# Patient Record
Sex: FEMALE | Race: WHITE | NOT HISPANIC OR LATINO | Employment: PART TIME | URBAN - METROPOLITAN AREA
[De-identification: names, ages, dates, MRNs, and addresses within clinical notes are randomized per-mention and may not be internally consistent; named-entity substitution may affect disease eponyms.]

---

## 2017-07-05 ENCOUNTER — ALLSCRIPTS OFFICE VISIT (OUTPATIENT)
Dept: OTHER | Facility: OTHER | Age: 22
End: 2017-07-05

## 2017-07-05 DIAGNOSIS — F41.9 ANXIETY DISORDER: ICD-10-CM

## 2017-07-14 ENCOUNTER — APPOINTMENT (OUTPATIENT)
Dept: LAB | Age: 22
End: 2017-07-14
Payer: COMMERCIAL

## 2017-07-14 ENCOUNTER — TRANSCRIBE ORDERS (OUTPATIENT)
Dept: ADMINISTRATIVE | Age: 22
End: 2017-07-14

## 2017-07-14 DIAGNOSIS — F41.9 ANXIETY DISORDER: ICD-10-CM

## 2017-07-14 LAB
T4 FREE SERPL-MCNC: 2.06 NG/DL (ref 0.76–1.46)
TSH SERPL DL<=0.05 MIU/L-ACNC: 1.01 UIU/ML (ref 0.36–3.74)

## 2017-07-14 PROCEDURE — 84439 ASSAY OF FREE THYROXINE: CPT

## 2017-07-14 PROCEDURE — 36415 COLL VENOUS BLD VENIPUNCTURE: CPT

## 2017-07-14 PROCEDURE — 84443 ASSAY THYROID STIM HORMONE: CPT

## 2017-11-26 ENCOUNTER — TRANSCRIBE ORDERS (OUTPATIENT)
Dept: ADMINISTRATIVE | Age: 22
End: 2017-11-26

## 2017-11-26 ENCOUNTER — OFFICE VISIT (OUTPATIENT)
Dept: URGENT CARE | Age: 22
End: 2017-11-26
Payer: COMMERCIAL

## 2017-11-26 ENCOUNTER — APPOINTMENT (OUTPATIENT)
Dept: LAB | Age: 22
End: 2017-11-26
Attending: PHYSICIAN ASSISTANT
Payer: COMMERCIAL

## 2017-11-26 DIAGNOSIS — R30.0 DYSURIA: ICD-10-CM

## 2017-11-26 PROCEDURE — 87186 SC STD MICRODIL/AGAR DIL: CPT

## 2017-11-26 PROCEDURE — 87077 CULTURE AEROBIC IDENTIFY: CPT

## 2017-11-26 PROCEDURE — 81002 URINALYSIS NONAUTO W/O SCOPE: CPT | Performed by: FAMILY MEDICINE

## 2017-11-26 PROCEDURE — 99213 OFFICE O/P EST LOW 20 MIN: CPT | Performed by: FAMILY MEDICINE

## 2017-11-26 PROCEDURE — 87086 URINE CULTURE/COLONY COUNT: CPT

## 2017-11-28 NOTE — PROGRESS NOTES
Assessment    1  Urinary tract infection (599 0) (N39 0)    Plan  Dysuria    · (1) URINE CULTURE; Source:Urine, Clean Catch; Status:Active - Retrospective ByProtocol Authorization; Requested for:[id number];    · Urine Dip Non-Automated- POC; Status:Complete;   Done: [id number] 02:51PM  Urinary tract infection    · Sulfamethoxazole-Trimethoprim 800-160 MG Oral Tablet (Bactrim DS); TAKE 1TABLET TWICE DAILY UNTIL FINISHED    Discussion/Summary  Discussion Summary:   Take Bactrim twice daily for 5 days  us in 2-3 days for urine culture results  fluidsdaily  Medication Side Effects Reviewed: Possible side effects of new medications were reviewed with the patient/guardian today  Understands and agrees with treatment plan: The treatment plan was reviewed with the patient/guardian  The patient/guardian understands and agrees with the treatment plan   Follow Up Instructions: Follow Up with your Primary Care Provider in 5 days  If your symptoms worsen, go to the nearest [place] [address] Emergency Department  Chief Complaint    1  Urinary Frequency  Chief Complaint Free Text Note Form: C/O urinary frequency, burning and slight pressure for 3 days ago  Then stopped and restarted last night  History of Present Illness  HPI: 79-year-old female presents with suprapubic pressure, urinary burning, and urinary frequency x2 days  She denies any new sexual partners  She has no vaginal discharge  Denies fever, chills, flank pain, nausea, vomiting  Hospital Based Practices Required Assessment:  Pain Assessment  the patient states they have pain  The pain is located in the urinary  The patient describes the pain as burning  (on a scale of 0 to 10, the patient rates the pain at 8 )  Abuse And Domestic Violence Screen   Yes, the patient is safe at home  -- The patient states no one is hurting them  Depression And Suicide Screen  No, the patient has not had thoughts of hurting themself    No, the patient has not felt depressed in the past 7 days  Review of Systems  Focused-Female:  Constitutional: no fever-- and-- no chills  Cardiovascular: no chest pain  Respiratory: no shortness of breath  Gastrointestinal: no nausea-- and-- no vomiting  Genitourinary: as noted in HPI,-- no vaginal discharge-- and-- no unexplained vaginal bleeding  Neurological: no headache  ROS Reviewed:   ROS reviewed  Active Problems  1  Anxiety (300 00) (F41 9)   2  Need for 23-polyvalent pneumococcal polysaccharide vaccine (V03 82) (Z23)   3  Need for influenza vaccination (V04 81) (Z23)   4  Need for Menactra vaccination (V03 89) (Z23)   5  Need for Tdap vaccination (V06 1) (Z23)   6  Need for tuberculosis vaccination (V03 2) (Z23)   7  Skin lesion (709 9) (L98 9)    Past Medical History  Active Problems And Past Medical History Reviewed: The active problems and past medical history were reviewed and updated today  Family History  Father    1  No pertinent family history  Family History Reviewed: The family history was reviewed and updated today  Social History     · Denied: History of Alcohol   · Denied: History of Drug Use   · Never A Smoker  Social History Reviewed: The social history was reviewed and updated today  The social history was reviewed and is unchanged  Surgical History  Surgical History Reviewed: The surgical history was reviewed and updated today  Current Meds   1  Multivitamins TABS; Therapy: (Recorded:26Nov2017) to Recorded  Medication List Reviewed: The medication list was reviewed and updated today  Allergies    1   Doxycycline Hyclate TABS    Vitals  Signs   Recorded: [id number] 02:33PM   Temperature: 98 4 F, Temporal  Heart Rate: 62  Respiration: 16  Systolic: 191, LUE, Sitting  Diastolic: 68, LUE, Sitting  Height: 5 ft 2 in  Weight: 104 lb   BMI Calculated: 19 02  BSA Calculated: 1 45  O2 Saturation: 98    Physical Exam   Constitutional  General appearance: No acute distress, well appearing and well nourished  Pulmonary  Respiratory effort: No increased work of breathing or signs of respiratory distress  Auscultation of lungs: Clear to auscultation  Cardiovascular  Auscultation of heart: Normal rate and rhythm, normal S1 and S2, without murmurs  Abdomen  Abdomen: Non-tender, no masses  The abdomen was flat  Bowel sounds were normal  The abdomen was soft and nontender  no CVA tenderness      Results/Data  Urine Dip Non-Automated- POC [id number] 02:51PM Morgan Mayesdavid     Test Name Result Flag Reference   Color Yellow     Clarity Hazy     Leukocytes small     Nitrite neg     Blood neg     Bilirubin neg     Urobilinogen 0 2     Protein neg     Ph 6 5     Specific Gravity 1 005     Ketone neg     Glucose neg         Lab Studies Reviewed: Urine dip results reviewed w patient- mild abnormalities- could indicate UTI        Signatures   Electronically signed by : Tena Lee [facility]; Nov 26 2017  3:04PM EST                       (Author)    Electronically signed by : Domingo Womack DO; Nov 27 2017  7:12AM EST                       (Co-author)

## 2017-11-29 LAB — BACTERIA UR CULT: ABNORMAL

## 2018-01-15 VITALS
BODY MASS INDEX: 18.95 KG/M2 | SYSTOLIC BLOOD PRESSURE: 90 MMHG | WEIGHT: 103 LBS | DIASTOLIC BLOOD PRESSURE: 60 MMHG | HEART RATE: 68 BPM | RESPIRATION RATE: 14 BRPM | HEIGHT: 62 IN

## 2018-05-04 ENCOUNTER — TELEPHONE (OUTPATIENT)
Dept: INTERNAL MEDICINE CLINIC | Facility: CLINIC | Age: 23
End: 2018-05-04

## 2018-05-04 NOTE — TELEPHONE ENCOUNTER
PT STATED THAT SHE HAS A FORM THAT NEEDS TO BE FILLED OUT FOR SCHOOL  SHE STATED THAT SHE NEEDS IT BY 7/15  PLEASE ADVISE AS TO WHERE I CAN PUT HER ON THE SCHEDULE       PT'S CB# [phone]

## 2018-06-06 ENCOUNTER — OFFICE VISIT (OUTPATIENT)
Dept: INTERNAL MEDICINE CLINIC | Facility: CLINIC | Age: 23
End: 2018-06-06
Payer: COMMERCIAL

## 2018-06-06 VITALS
RESPIRATION RATE: 14 BRPM | HEIGHT: 62 IN | WEIGHT: 107.8 LBS | SYSTOLIC BLOOD PRESSURE: 100 MMHG | BODY MASS INDEX: 19.84 KG/M2 | DIASTOLIC BLOOD PRESSURE: 70 MMHG | HEART RATE: 68 BPM

## 2018-06-06 DIAGNOSIS — F41.9 ANXIETY: Primary | Chronic | ICD-10-CM

## 2018-06-06 PROCEDURE — 3008F BODY MASS INDEX DOCD: CPT | Performed by: INTERNAL MEDICINE

## 2018-06-06 PROCEDURE — 99213 OFFICE O/P EST LOW 20 MIN: CPT | Performed by: INTERNAL MEDICINE

## 2018-06-06 NOTE — PROGRESS NOTES
Assessment/Plan:  1  General care-physical completed-ppd not done as this was done a year ago  2  Anxiety-worsened over the past 2 years  Has some secondary depression  Was seeing a counselor Michael intermittently  Family history anxiety depression  She requested a p r n  med today was given Klonopin 0 5 milligrams 1/2 to 1 p o  b i d  p r n     As noted TSH normal  3  Abnormal thyroid blood work-TSH normal but free T4 elevated-endocrinologist feels this is an abnormal pattern for her family and that TSH is basically normal in no significant issues  4  Previously noted skin lesions-by description may have been staph coccal-rule out MRSA-was treated with doxycycline at another institution and all resolved  5  History of surgery young age with repair with a soft feels fish alert as well as renal surgery  Was felt to have a small syrinx as well as VSD that have not been issues since she was a child  She is released from prior physician the told no follow-up needed    Medical regimen-Klonopin 0 5 milligrams - 1/2 to 1 p o  B i d  P r n  Addendum- patient had PPD that was needed was placed and read in July 2018 with no reaction    Addendum-patient called the office in January 2nd regarding referral for psychologist   She was given the number about some counseling  Was also recommended she come in for follow-up appointment because of her p r n  benzodiazepine use  No problem-specific Assessment & Plan notes found for this encounter  Diagnoses and all orders for this visit:    Anxiety          Subjective:      Patient ID: Cyndi Herrera is a 20 y o  female  Needs a school physical   This was completed  Having some intermittent needs IV issues as before  Previously spoke with the local counselor  She had been seeing her previously  Family history  She had labs done    TSH normal   Free T4 elevated although her mother has same pattern endocrinologist feels this is likely get echo-specifically 5 TSH normal but free T4 elevated  He was even thinking about with the patient's mother ordering free T4 but dialysis  She wants know about p r n  use of a benzodiazepine  We reviewed that in detail  She says she does not have any major depressive symptoms  Not a significant alcohol user  No recreational drug use  The        The following portions of the patient's history were reviewed and updated as appropriate: current medications, past family history, past medical history, past social history, past surgical history and problem list     Review of Systems   Constitutional: Negative  Respiratory: Negative  Cardiovascular: Negative  Gastrointestinal: Negative  Endocrine: Negative  Genitourinary: Negative  Musculoskeletal: Negative  Neurological: Negative  Hematological: Negative  Psychiatric/Behavioral: The patient is nervous/anxious  Objective:      /70   Pulse 68   Resp 14   Ht 5' 2" (1 575 m)   Wt 48 9 kg (107 lb 12 8 oz)   BMI 19 72 kg/m²          Physical Exam   Constitutional: She is oriented to person, place, and time  She appears well-developed and well-nourished  No distress  HENT:   Head: Normocephalic and atraumatic  Right Ear: External ear normal    Left Ear: External ear normal    Nose: Nose normal    Mouth/Throat: Oropharynx is clear and moist  No oropharyngeal exudate  Eyes: Conjunctivae and EOM are normal  Pupils are equal, round, and reactive to light  Right eye exhibits no discharge  Left eye exhibits no discharge  No scleral icterus  Neck: Normal range of motion  Neck supple  No JVD present  No tracheal deviation present  No thyromegaly present  Cardiovascular: Normal rate, regular rhythm, normal heart sounds and intact distal pulses  Exam reveals no gallop and no friction rub  No murmur heard  Pulmonary/Chest: Effort normal and breath sounds normal  No stridor  No respiratory distress  She has no wheezes   She has no rales  She exhibits no tenderness  Abdominal: Soft  Bowel sounds are normal  She exhibits no distension and no mass  There is no tenderness  There is no rebound and no guarding  Musculoskeletal: Normal range of motion  She exhibits no edema or deformity  Lymphadenopathy:     She has no cervical adenopathy  Neurological: She is alert and oriented to person, place, and time  She has normal reflexes  No cranial nerve deficit  She exhibits normal muscle tone  Coordination normal    Skin: Skin is warm and dry  No rash noted  No erythema  Psychiatric: She has a normal mood and affect  Her behavior is normal  Judgment and thought content normal    Vitals reviewed

## 2018-07-19 RX ORDER — SULFAMETHOXAZOLE AND TRIMETHOPRIM 800; 160 MG/1; MG/1
1 TABLET ORAL 2 TIMES DAILY
COMMUNITY
Start: 2017-11-26 | End: 2019-02-07 | Stop reason: ALTCHOICE

## 2018-07-19 RX ORDER — MULTIVITAMIN
TABLET ORAL
COMMUNITY

## 2018-07-24 ENCOUNTER — CLINICAL SUPPORT (OUTPATIENT)
Dept: INTERNAL MEDICINE CLINIC | Facility: CLINIC | Age: 23
End: 2018-07-24
Payer: COMMERCIAL

## 2018-07-24 DIAGNOSIS — Z11.1 PPD SCREENING TEST: Primary | ICD-10-CM

## 2018-07-24 PROCEDURE — 86580 TB INTRADERMAL TEST: CPT

## 2018-07-24 PROCEDURE — 99211 OFF/OP EST MAY X REQ PHY/QHP: CPT

## 2018-07-31 LAB
INDURATION: 0.01 MM
TB SKIN TEST: NEGATIVE

## 2018-11-23 ENCOUNTER — HOSPITAL ENCOUNTER (EMERGENCY)
Facility: HOSPITAL | Age: 23
Discharge: HOME | End: 2018-11-23
Attending: EMERGENCY MEDICINE | Admitting: EMERGENCY MEDICINE
Payer: COMMERCIAL

## 2018-11-23 ENCOUNTER — APPOINTMENT (EMERGENCY)
Dept: RADIOLOGY | Facility: HOSPITAL | Age: 23
End: 2018-11-23
Payer: COMMERCIAL

## 2018-11-23 VITALS
OXYGEN SATURATION: 100 % | HEIGHT: 62 IN | SYSTOLIC BLOOD PRESSURE: 154 MMHG | HEART RATE: 62 BPM | TEMPERATURE: 98.3 F | BODY MASS INDEX: 19.72 KG/M2 | RESPIRATION RATE: 18 BRPM | DIASTOLIC BLOOD PRESSURE: 67 MMHG

## 2018-11-23 DIAGNOSIS — S60.111A SUBUNGUAL HEMATOMA OF RIGHT THUMB, INITIAL ENCOUNTER: Primary | ICD-10-CM

## 2018-11-23 PROCEDURE — 73140 X-RAY EXAM OF FINGER(S): CPT

## 2018-11-23 PROCEDURE — 99283 EMERGENCY DEPT VISIT LOW MDM: CPT

## 2018-11-24 NOTE — ED PROVIDER NOTES
History  Chief Complaint   Patient presents with    Finger Injury     Presents for evaluation of right thumb injury s/p slamming finger in car door x3 days prior  Finger progressively more swollen and nail bed now black  ROM presents  Last Tylenol PTA 1645  This is a 27-year-old female patient who closed her right thumb in a car door approximately 3 days ago and now has throbbing under her nail and a collection of blood consistent with a subungual hematoma  This pain is refractory to Motrin  No numbness or tingling  Nothing really makes the pain better or worse  She will have an x-ray to rule out a fracture and then she will undergo a trephination      Procedure note:  Patient's right thumb had a trephination performed with the he did tip  There is immediate blood release and drainage and the pain went away  There is no anesthesia necessary she tolerated procedure well without complication has good distal sensation range of motion of the thumb  Prior to Admission Medications   Prescriptions Last Dose Informant Patient Reported? Taking? Multiple Vitamin (MULTI-DAY VITAMINS) TABS   Yes No   Sig: Take by mouth   sulfamethoxazole-trimethoprim (BACTRIM DS) 800-160 mg per tablet   Yes No   Sig: Take 1 tablet by mouth 2 (two) times a day      Facility-Administered Medications: None       History reviewed  No pertinent past medical history  History reviewed  No pertinent surgical history  Family History   Problem Relation Age of Onset    No Known Problems Father      I have reviewed and agree with the history as documented  Social History   Substance Use Topics    Smoking status: Never Smoker    Smokeless tobacco: Never Used    Alcohol use No        Review of Systems   All other systems reviewed and are negative  Physical Exam  Physical Exam   Constitutional: She appears well-developed and well-nourished  HENT:   Head: Normocephalic and atraumatic     Right Ear: External ear normal    Left Ear: External ear normal    Nose: Nose normal    Mouth/Throat: Oropharynx is clear and moist    Eyes: Pupils are equal, round, and reactive to light  Conjunctivae are normal    Neck: Normal range of motion  Neck supple  Cardiovascular: Normal rate and regular rhythm  Pulmonary/Chest: Effort normal and breath sounds normal    Abdominal: Soft  Bowel sounds are normal  There is no tenderness  Musculoskeletal: Normal range of motion  Hands:  Neurological: She is alert  Skin: Skin is warm  Psychiatric: She has a normal mood and affect  Her behavior is normal    Nursing note and vitals reviewed  Vital Signs  ED Triage Vitals [11/23/18 1918]   Temperature Pulse Respirations Blood Pressure SpO2   98 3 °F (36 8 °C) 62 18 154/67 100 %      Temp Source Heart Rate Source Patient Position - Orthostatic VS BP Location FiO2 (%)   Oral Monitor Sitting Right arm --      Pain Score       8           Vitals:    11/23/18 1918   BP: 154/67   Pulse: 62   Patient Position - Orthostatic VS: Sitting       Visual Acuity      ED Medications  Medications - No data to display    Diagnostic Studies  Results Reviewed     None                 XR thumb first digit-min 2 views RIGHT   ED Interpretation by Victorino Rubio PA-C (11/23 1940)   NAD                 Procedures  Procedures       Phone Contacts  ED Phone Contact    ED Course                               MDM  CritCare Time    Disposition  Final diagnoses:   Subungual hematoma of right thumb, initial encounter     Time reflects when diagnosis was documented in both MDM as applicable and the Disposition within this note     Time User Action Codes Description Comment    11/23/2018  7:52 PM [place], [address] [facility] Add [S60 111A] Subungual hematoma of right thumb, initial encounter       ED Disposition     ED Disposition Condition Comment    Discharge  Jak Reynoso discharge to home/self care      Condition at discharge: Good        Follow-up Information Follow up With Specialties Details Why Contact Info    Joshua Escobar MD Plastic Surgery, Hand Surgery Schedule an appointment as soon as possible for a visit  [address]  [address]            Patient's Medications   Discharge Prescriptions    No medications on file     No discharge procedures on file      ED Provider  Electronically Signed by           Kimmie Hoffman PA-C  11/23/18 1952

## 2018-11-24 NOTE — DISCHARGE INSTRUCTIONS
Subungual Hematoma   WHAT YOU NEED TO KNOW:   What is a subungual hematoma? A subungual hematoma is a collection of blood under your fingernail or toenail  The most common cause is an injury, such as slamming your nail in a door or dropping an object on your nail  A subungual hematoma can occur if you take blood thinning or cancer treatment medicines  You may also be at risk if you have an autoimmune condition, Kaposi sarcoma, or melanoma  What are the signs and symptoms of a subungual hematoma? · Red or purplish blood collection under the nail    · Throbbing pain and swelling in the affected finger or toe    · Tenderness to the touch  How is a subungual hematoma diagnosed? Your healthcare provider will know you have a subungual hematoma by looking at your nail  The following tests may also be done:  · A dermoscopy is a procedure used to look for damage to your nail through a microscope  · An x-ray  of your hand or foot may be done to check for broken bones  · A biopsy is a procedure to remove all or part of your nail  The nail is sent to a lab for tests  How is a subungual hematoma treated? · Ice and elevate your affected finger or toe  Place ice wrapped in a towel over the painful area for as long as directed  Elevate your hand or foot on pillows above the level of your heart to help decrease swelling and pain  · NSAIDs  help decrease swelling and pain  This medicine is available without a doctor's order  NSAIDs can cause stomach bleeding or kidney problems  If you take blood thinner medicine, always ask your healthcare provider if NSAIDs are safe for you  Always read the medicine label and follow directions  · Trephination is a procedure used to make a small hole or holes in your nail to drain the blood  This will help decrease pain and swelling  · Nail removal may be done if your nail is damaged  Your healthcare provider may also need to repair the tissue under your nail       · A splint may be placed on your finger or toe to prevent movement and protect the area while it heals  How can I help care for my subungual hematoma? · Keep your injured finger or toe dry for as long as directed  · Gently trim your nail if it begins to fall off in pieces  This may decrease your risk for catching the nail on an object or ripping it off  · Wear shoes that are comfortable and fit correctly to prevent more injury to your toe  When should I contact my healthcare provider? · You have increased redness, swelling, or pain  · You notice pus or a bad smell coming from your nail  · You see red streaks on your finger or toe that starts from your nail  · Your nail falls off and there is bleeding  · You have questions or concerns about your condition or care  CARE AGREEMENT:   You have the right to help plan your care  Learn about your health condition and how it may be treated  Discuss treatment options with your caregivers to decide what care you want to receive  You always have the right to refuse treatment  The above information is an  only  It is not intended as medical advice for individual conditions or treatments  Talk to your doctor, nurse or pharmacist before following any medical regimen to see if it is safe and effective for you  © 2017 [facility] Information is for End User's use only and may not be sold, redistributed or otherwise used for commercial purposes  All illustrations and images included in CareNotes® are the copyrighted property of A D A M [employer]  or [place]

## 2018-12-31 ENCOUNTER — TELEPHONE (OUTPATIENT)
Dept: INTERNAL MEDICINE CLINIC | Facility: CLINIC | Age: 23
End: 2018-12-31

## 2019-01-02 NOTE — TELEPHONE ENCOUNTER
For psychologist I recommend [facility] counseling-please give her the number-explained to her they will take her history and place her with appropriate counselor    Also she should have a follow-up appointment through this office regarding her medication use-schedule her over the next month with Dr Harris-let her know the office situation and that he will be her new primary care physician

## 2019-01-07 ENCOUNTER — OFFICE VISIT (OUTPATIENT)
Dept: INTERNAL MEDICINE CLINIC | Facility: CLINIC | Age: 24
End: 2019-01-07
Payer: COMMERCIAL

## 2019-01-07 VITALS
OXYGEN SATURATION: 98 % | HEART RATE: 58 BPM | HEIGHT: 62 IN | BODY MASS INDEX: 19.14 KG/M2 | DIASTOLIC BLOOD PRESSURE: 60 MMHG | WEIGHT: 104 LBS | SYSTOLIC BLOOD PRESSURE: 100 MMHG

## 2019-01-07 DIAGNOSIS — Z13.220 SCREENING FOR HYPERLIPIDEMIA: ICD-10-CM

## 2019-01-07 DIAGNOSIS — E55.9 VITAMIN D DEFICIENCY: ICD-10-CM

## 2019-01-07 DIAGNOSIS — Z00.00 ENCOUNTER FOR ANNUAL PHYSICAL EXAMINATION EXCLUDING GYNECOLOGICAL EXAMINATION IN A PATIENT OLDER THAN 17 YEARS: ICD-10-CM

## 2019-01-07 DIAGNOSIS — Z13.29 SCREENING FOR THYROID DISORDER: ICD-10-CM

## 2019-01-07 DIAGNOSIS — L03.011 PARONYCHIA OF RIGHT THUMB: ICD-10-CM

## 2019-01-07 DIAGNOSIS — F41.9 ANXIETY: ICD-10-CM

## 2019-01-07 DIAGNOSIS — Z13.1 SCREENING FOR DIABETES MELLITUS: Primary | ICD-10-CM

## 2019-01-07 PROCEDURE — 99395 PREV VISIT EST AGE 18-39: CPT | Performed by: INTERNAL MEDICINE

## 2019-01-07 NOTE — PROGRESS NOTES
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 [employer]   -states that she met her boyfriend's family over the holidays resulting in significant stress  -states that she is currently taking Klonopin 0 5 mg, half dose to full dose b i d  P r n  As needed  -states that she is worried about her confidence issues and will be seeing [facility] tomorrow for evaluation    #Abnormal thyroid  -reports history of in her mother with hypothyroidism with thyroid removed and on synthroid  -TSH normal with elevated T4  -recheck TSH today    #Family History of CAD  -positive in paternal grandfather with MI, CABG, pacer, heart transplant  Father has HTN  -will check HLD    #Family History of DM  -positive in grandmother  -will check a1c today    #Health Maintenance  -routine labs today and return to care as needed  -flu vaccine 2018 and up to date  -HPV vaccine script given to patient  -referral to gyn for pap and pelvic    Patient completed routine labs revealing A1c 5 4, vitamin-D 21 7, TSH 1 085  Awaiting lipid and CMP panel  Message was left on patient's voicemail to start 2000 units vitamin-D daily  Addendum 9/6/19 patient encountered a needle stick at work and had hepatitis B titers drawn which showed surface Ab less than 3 1  Patient will need revaccination with HBV vaccine  Also refilled her clonazepam     No problem-specific Assessment & Plan notes found for this encounter  Diagnoses and all orders for this visit:    Screening for diabetes mellitus  -     CBC and differential; Future  -     Vitamin D 25 hydroxy; Future  -     Lipid Panel with Direct LDL reflex; Future  -     Hemoglobin A1C; Future  -     TSH, 3rd generation with Free T4 reflex; Future  -     Comprehensive metabolic panel;  Future    Screening for hyperlipidemia  - CBC and differential; Future  -     Vitamin D 25 hydroxy; Future  -     Lipid Panel with Direct LDL reflex; Future  -     Hemoglobin A1C; Future  -     TSH, 3rd generation with Free T4 reflex; Future  -     Comprehensive metabolic panel; Future    Screening for thyroid disorder  -     CBC and differential; Future  -     Vitamin D 25 hydroxy; Future  -     Lipid Panel with Direct LDL reflex; Future  -     Hemoglobin A1C; Future  -     TSH, 3rd generation with Free T4 reflex; Future  -     Comprehensive metabolic panel; Future    Vitamin D deficiency  -     CBC and differential; Future  -     Vitamin D 25 hydroxy; Future  -     Lipid Panel with Direct LDL reflex; Future  -     Hemoglobin A1C; Future  -     TSH, 3rd generation with Free T4 reflex; Future  -     Comprehensive metabolic panel; Future    Paronychia of right thumb  -     CBC and differential; Future  -     Vitamin D 25 hydroxy; Future  -     Lipid Panel with Direct LDL reflex; Future  -     Hemoglobin A1C; Future  -     TSH, 3rd generation with Free T4 reflex; Future  -     Comprehensive metabolic panel; Future    Encounter for annual physical examination excluding gynecological examination in a patient older than 17 years  -     Cancel: Ambulatory referral to Gynecology; Future  -     CBC and differential; Future  -     Vitamin D 25 hydroxy; Future  -     Lipid Panel with Direct LDL reflex; Future  -     Hemoglobin A1C; Future  -     TSH, 3rd generation with Free T4 reflex; Future  -     Comprehensive metabolic panel; Future  -     Ambulatory referral to Gynecology; Future            Current Outpatient Prescriptions:     Multiple Vitamin (MULTI-DAY VITAMINS) TABS, Take by mouth, Disp: , Rfl:     sulfamethoxazole-trimethoprim (BACTRIM DS) 800-160 mg per tablet, Take 1 tablet by mouth 2 (two) times a day, Disp: , Rfl:     Subjective:      Patient ID: Girish Stauffer is a 24 y o  female  HPI     Patient presents for routine checkup    Denies any recent surgeries  States that on Thanksgiving she had her thumb jammed in the door and ended up losing the nail and requiring emergency evaluation for trephanation  Patient states that her finger feels significantly better this time  She states that she has a history of general anxiety disorder and has been taking Klonopin 0 5 mg to 2 1 mg as needed on an irregular basis  She states that 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 and over the holidays met her boyfriend's family aches increasing her anxiety however currently symptoms are under control  She states that [facility] for an evaluation regarding her self confidence issues  Patient reports that her mother has an abnormal thyroid requiring thyroid removal and Synthroid supplementation  We will obtain TSH as well as other routine labs  Patient reports that her father had heart disease as well as high blood pressure and a grandfather who had prior pacemaker and CABG with heart transplant  She states that her mother's grandfather had diabetes  She states that she has received a flu vaccine this year  She states that she has not seen OB Gyne in many years and we will refer her to [facility] for an evaluation  We also gave her script for the HPV vaccine today  Patient's blood pressure was 100/60 with a pulse of 58 and saturating at 98% on room air without any issues  Patient can return to care on an as-needed basis  The following portions of the patient's history were reviewed and updated as appropriate: allergies, current medications, past family history, past medical history, past social history, past surgical history and problem list     Review of Systems   Constitutional: Negative for activity change, appetite change, chills, fatigue and fever  HENT: Negative for congestion, ear pain, facial swelling, hearing loss, sore throat, tinnitus and trouble swallowing      Eyes: Negative for photophobia and visual disturbance  Respiratory: Negative for cough, shortness of breath and wheezing  Cardiovascular: Negative for chest pain and leg swelling  Gastrointestinal: Negative for abdominal distention, abdominal pain, blood in stool, nausea and vomiting  Genitourinary: Negative for difficulty urinating, dysuria, flank pain, frequency, pelvic pain and urgency  Musculoskeletal: Negative for arthralgias, back pain, gait problem, joint swelling and myalgias  Skin: Negative for rash and wound  Neurological: Negative for dizziness, tremors, light-headedness and headaches  Psychiatric/Behavioral: Negative for self-injury, sleep disturbance and suicidal ideas  The patient is nervous/anxious  Objective:      /60   Pulse 58   Ht 5' 2" (1 575 m)   Wt 47 2 kg (104 lb)   SpO2 98%   BMI 19 02 kg/m²          Physical Exam   Constitutional: She is oriented to person, place, and time  She appears well-developed and well-nourished  HENT:   Head: Normocephalic and atraumatic  Right Ear: External ear normal    Left Ear: External ear normal    Nose: Nose normal    Mouth/Throat: Oropharynx is clear and moist    Eyes: Pupils are equal, round, and reactive to light  Conjunctivae and EOM are normal    Neck: Normal range of motion  Neck supple  No JVD present  No thyromegaly present  Cardiovascular: Normal rate, regular rhythm, normal heart sounds and intact distal pulses  No murmur heard  Pulmonary/Chest: Effort normal and breath sounds normal  No stridor  No respiratory distress  She has no wheezes  Abdominal: Soft  Bowel sounds are normal  She exhibits no distension  There is no tenderness  There is no rebound and no guarding  Musculoskeletal: Normal range of motion  She exhibits no edema or tenderness  Lymphadenopathy:     She has no cervical adenopathy  Neurological: She is alert and oriented to person, place, and time  She has normal reflexes  Skin: Skin is warm   No rash noted  No erythema  Psychiatric: She has a normal mood and affect  Her behavior is normal  Judgment and thought content normal    Vitals reviewed

## 2019-01-30 ENCOUNTER — APPOINTMENT (OUTPATIENT)
Dept: LAB | Facility: CLINIC | Age: 24
End: 2019-01-30
Payer: COMMERCIAL

## 2019-01-30 ENCOUNTER — TRANSCRIBE ORDERS (OUTPATIENT)
Dept: RADIOLOGY | Facility: CLINIC | Age: 24
End: 2019-01-30

## 2019-01-30 DIAGNOSIS — E55.9 VITAMIN D DEFICIENCY: ICD-10-CM

## 2019-01-30 DIAGNOSIS — Z13.220 SCREENING FOR HYPERLIPIDEMIA: ICD-10-CM

## 2019-01-30 DIAGNOSIS — Z13.1 SCREENING FOR DIABETES MELLITUS: ICD-10-CM

## 2019-01-30 DIAGNOSIS — Z13.29 SCREENING FOR THYROID DISORDER: ICD-10-CM

## 2019-01-30 DIAGNOSIS — Z00.00 ENCOUNTER FOR ANNUAL PHYSICAL EXAMINATION EXCLUDING GYNECOLOGICAL EXAMINATION IN A PATIENT OLDER THAN 17 YEARS: ICD-10-CM

## 2019-01-30 DIAGNOSIS — L03.011 PARONYCHIA OF RIGHT THUMB: ICD-10-CM

## 2019-01-30 LAB
25(OH)D3 SERPL-MCNC: 21.7 NG/ML (ref 30–100)
BASOPHILS # BLD AUTO: 0.02 THOUSANDS/ΜL (ref 0–0.1)
BASOPHILS NFR BLD AUTO: 0 % (ref 0–1)
EOSINOPHIL # BLD AUTO: 0.06 THOUSAND/ΜL (ref 0–0.61)
EOSINOPHIL NFR BLD AUTO: 1 % (ref 0–6)
ERYTHROCYTE [DISTWIDTH] IN BLOOD BY AUTOMATED COUNT: 11.7 % (ref 11.6–15.1)
EST. AVERAGE GLUCOSE BLD GHB EST-MCNC: 108 MG/DL
HBA1C MFR BLD: 5.4 % (ref 4.2–6.3)
HCT VFR BLD AUTO: 43.9 % (ref 34.8–46.1)
HGB BLD-MCNC: 15 G/DL (ref 11.5–15.4)
IMM GRANULOCYTES # BLD AUTO: 0.03 THOUSAND/UL (ref 0–0.2)
IMM GRANULOCYTES NFR BLD AUTO: 1 % (ref 0–2)
LYMPHOCYTES # BLD AUTO: 1.73 THOUSANDS/ΜL (ref 0.6–4.47)
LYMPHOCYTES NFR BLD AUTO: 27 % (ref 14–44)
MCH RBC QN AUTO: 31.2 PG (ref 26.8–34.3)
MCHC RBC AUTO-ENTMCNC: 34.2 G/DL (ref 31.4–37.4)
MCV RBC AUTO: 91 FL (ref 82–98)
MONOCYTES # BLD AUTO: 0.39 THOUSAND/ΜL (ref 0.17–1.22)
MONOCYTES NFR BLD AUTO: 6 % (ref 4–12)
NEUTROPHILS # BLD AUTO: 4.1 THOUSANDS/ΜL (ref 1.85–7.62)
NEUTS SEG NFR BLD AUTO: 65 % (ref 43–75)
NRBC BLD AUTO-RTO: 0 /100 WBCS
PLATELET # BLD AUTO: 220 THOUSANDS/UL (ref 149–390)
PMV BLD AUTO: 9.8 FL (ref 8.9–12.7)
RBC # BLD AUTO: 4.81 MILLION/UL (ref 3.81–5.12)
TSH SERPL DL<=0.05 MIU/L-ACNC: 1.08 UIU/ML (ref 0.36–3.74)
WBC # BLD AUTO: 6.33 THOUSAND/UL (ref 4.31–10.16)

## 2019-01-30 PROCEDURE — 36415 COLL VENOUS BLD VENIPUNCTURE: CPT

## 2019-01-30 PROCEDURE — 82306 VITAMIN D 25 HYDROXY: CPT

## 2019-01-30 PROCEDURE — 84443 ASSAY THYROID STIM HORMONE: CPT

## 2019-01-30 PROCEDURE — 85025 COMPLETE CBC W/AUTO DIFF WBC: CPT

## 2019-01-30 PROCEDURE — 83036 HEMOGLOBIN GLYCOSYLATED A1C: CPT

## 2019-02-05 NOTE — PROGRESS NOTES
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  Diagnoses and all orders for this visit:    Encntr for gyn exam (general) (routine) w abnormal findings  -     Liquid-based pap, screening    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  -     US pelvis complete non OB; Future    Routine screening for STI (sexually transmitted infection)  -     Chlamydia/GC amplified DNA by PCR    Other orders  -     clonazePAM (KlonoPIN) 0 5 mg tablet; Take 0 5 mg by mouth 2 (two) times a day  -     Cholecalciferol (VITAMIN D3) 1000 units CAPS; Take by mouth  -     BIOTIN PO; Take by mouth              Subjective:      Patient ID: German Duran is a 24 y o  female  German Duran is a 24 y o  female who is here today as a new patient for her annual visit  No health concerns  Monthly menses x 5 days with varied flow  Menses is acceptable  German Duran is sexually active with male partner of 7 5 years  Uses condom for pregnancy prevention  Pregnancy is not acceptable  Would consider birth control  Denies dyspareunia  Exercises 4 times per week  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  Hopes to work in [employer]  Living at home has been difficult as her relationship with her mom is strained           The following portions of the patient's history were reviewed and updated as appropriate: allergies, current medications, past family history, past medical history, past social history, past surgical history and problem list     Review of Systems   Constitutional: Negative  Negative for activity change, appetite change, chills, diaphoresis, fatigue, fever and unexpected weight change  HENT: Negative for congestion, dental problem, sneezing, sore throat and trouble swallowing  Eyes: Negative for visual disturbance  Respiratory: Negative for chest tightness and shortness of breath  Cardiovascular: Negative for chest pain and leg swelling  Gastrointestinal: Negative for abdominal pain, constipation, diarrhea, nausea and vomiting  Genitourinary: Negative for difficulty urinating, dyspareunia, dysuria, frequency, hematuria, menstrual problem, pelvic pain, urgency, vaginal bleeding, vaginal discharge and vaginal pain  Musculoskeletal: Negative for back pain and neck pain  Skin: Negative  Allergic/Immunologic: Negative  Neurological: Negative for weakness and headaches  Hematological: Negative for adenopathy  Psychiatric/Behavioral: Negative  Objective:      /62 (BP Location: Right arm, Patient Position: Sitting)   Pulse 74   Ht 5' 2" (1 575 m)   Wt 47 kg (103 lb 9 6 oz)   LMP 01/30/2019   BMI 18 95 kg/m²          Physical Exam   Constitutional: She is oriented to person, place, and time  She appears well-developed and well-nourished  HENT:   Head: Normocephalic and atraumatic  Eyes: Right eye exhibits no discharge  Left eye exhibits no discharge  Neck: Normal range of motion  Neck supple  Cardiovascular: Normal rate, regular rhythm, normal heart sounds and intact distal pulses  Pulmonary/Chest: Effort normal and breath sounds normal    Abdominal: Soft  Genitourinary: Vagina normal and uterus normal  Rectal exam shows no external hemorrhoid  No breast swelling, tenderness, discharge or bleeding  No labial fusion  There is no rash, tenderness, lesion or injury on the right labia  There is no rash, tenderness, lesion or injury on the left labia   Cervix exhibits no motion tenderness, no discharge and no friability  Right adnexum displays no mass, no tenderness and no fullness  Left adnexum displays no mass, no tenderness and no fullness  No erythema, tenderness or bleeding in the vagina  No foreign body in the vagina  No signs of injury around the vagina  No vaginal discharge found  Genitourinary Comments: No perineum  Thin membrane between vagina and rectum  No rectal sphincter  Granulation tissue note on right rectal wall  Rectum in expected vaginal position-likely having rectal penetrative sex instead of vaginal as she thought   Anatomy explained to patient through use of mirror  Musculoskeletal: Normal range of motion  Lymphadenopathy:     She has no cervical adenopathy  Right: No inguinal adenopathy present  Left: No inguinal adenopathy present  Neurological: She is alert and oriented to person, place, and time  Skin: Skin is warm and dry  Psychiatric: She has a normal mood and affect  Nursing note and vitals reviewed

## 2019-02-07 ENCOUNTER — OFFICE VISIT (OUTPATIENT)
Dept: GYNECOLOGY | Facility: CLINIC | Age: 24
End: 2019-02-07
Payer: COMMERCIAL

## 2019-02-07 VITALS
BODY MASS INDEX: 19.06 KG/M2 | SYSTOLIC BLOOD PRESSURE: 116 MMHG | WEIGHT: 103.6 LBS | HEIGHT: 62 IN | HEART RATE: 74 BPM | DIASTOLIC BLOOD PRESSURE: 62 MMHG

## 2019-02-07 DIAGNOSIS — Q52.9: ICD-10-CM

## 2019-02-07 DIAGNOSIS — Z11.3 ROUTINE SCREENING FOR STI (SEXUALLY TRANSMITTED INFECTION): ICD-10-CM

## 2019-02-07 DIAGNOSIS — Z30.09 ENCOUNTER FOR COUNSELING REGARDING CONTRACEPTION: ICD-10-CM

## 2019-02-07 DIAGNOSIS — Z01.411 ENCNTR FOR GYN EXAM (GENERAL) (ROUTINE) W ABNORMAL FINDINGS: Primary | ICD-10-CM

## 2019-02-07 PROCEDURE — 87591 N.GONORRHOEAE DNA AMP PROB: CPT | Performed by: NURSE PRACTITIONER

## 2019-02-07 PROCEDURE — S0610 ANNUAL GYNECOLOGICAL EXAMINA: HCPCS | Performed by: NURSE PRACTITIONER

## 2019-02-07 PROCEDURE — 87491 CHLMYD TRACH DNA AMP PROBE: CPT | Performed by: NURSE PRACTITIONER

## 2019-02-07 PROCEDURE — G0143 SCR C/V CYTO,THINLAYER,RESCR: HCPCS | Performed by: NURSE PRACTITIONER

## 2019-02-07 RX ORDER — CLONAZEPAM 0.5 MG/1
0.5 TABLET ORAL 2 TIMES DAILY
COMMUNITY
End: 2020-04-03 | Stop reason: SDUPTHER

## 2019-02-07 RX ORDER — BIOTIN 1 MG
TABLET ORAL
COMMUNITY

## 2019-02-07 NOTE — PATIENT INSTRUCTIONS
Pap every 3 year if normal-done, STI testing as indicated-done, exercise most days of week, appropriate diet and hydration, Calcium 1000mg + 600 vit D daily, birth control as directed (ACHES reviewed)  Benefits, risks and alternatives discussed/reviewed  HPV vaccine-will check coverage and call office  Annual mammogram starting at age 35, monthly BSE  Complete pelvic US

## 2019-02-12 LAB
C TRACH DNA SPEC QL NAA+PROBE: NEGATIVE
LAB AP GYN PRIMARY INTERPRETATION: NORMAL
Lab: NORMAL
N GONORRHOEA DNA SPEC QL NAA+PROBE: NEGATIVE

## 2019-02-22 ENCOUNTER — OFFICE VISIT (OUTPATIENT)
Dept: GYNECOLOGY | Facility: CLINIC | Age: 24
End: 2019-02-22
Payer: COMMERCIAL

## 2019-02-22 VITALS
HEART RATE: 79 BPM | WEIGHT: 105.2 LBS | HEIGHT: 62 IN | DIASTOLIC BLOOD PRESSURE: 62 MMHG | BODY MASS INDEX: 19.36 KG/M2 | SYSTOLIC BLOOD PRESSURE: 118 MMHG

## 2019-02-22 DIAGNOSIS — Z23 NEED FOR HPV VACCINATION: ICD-10-CM

## 2019-02-22 DIAGNOSIS — Z30.011 ENCOUNTER FOR PRESCRIPTION OF ORAL CONTRACEPTIVES: Primary | ICD-10-CM

## 2019-02-22 PROCEDURE — 99213 OFFICE O/P EST LOW 20 MIN: CPT | Performed by: NURSE PRACTITIONER

## 2019-02-22 PROCEDURE — 90471 IMMUNIZATION ADMIN: CPT | Performed by: NURSE PRACTITIONER

## 2019-02-22 PROCEDURE — 90651 9VHPV VACCINE 2/3 DOSE IM: CPT | Performed by: NURSE PRACTITIONER

## 2019-02-22 RX ORDER — LEVONORGESTREL AND ETHINYL ESTRADIOL 0.1-0.02MG
1 KIT ORAL DAILY
Qty: 28 TABLET | Refills: 2 | Status: SHIPPED | OUTPATIENT
Start: 2019-02-22 | End: 2019-06-14 | Stop reason: SDUPTHER

## 2019-02-22 NOTE — PATIENT INSTRUCTIONS
Start birth control and use back up method as directed  Benefits, risks and alternatives of birth control discussed  ACHES reviewed  Exercise 150 minutes per week  minimum  Always condom use  Gardisil vaccine administered today  Encouraged to complete pelvic US and check with insurance to check for coverage prior

## 2019-02-22 NOTE — PROGRESS NOTES
Assessment/Plan:      Start birth control and use back up method as directed  Benefits, risks and alternatives of birth control discussed  ACHES reviewed  Exercise 150 minutes per week  minimum  Always condom use  Gardisil vaccine administered today  Encouraged to complete pelvic US and check with insurance to check for coverage prior  Diagnoses and all orders for this visit:    Encounter for prescription of oral contraceptives  -     levonorgestrel-ethinyl estradiol (AVIANE,ALESSE,LESSINA) 0 1-20 MG-MCG per tablet; Take 1 tablet by mouth daily              Subjective:      Patient ID: Bay Guerrero is a 24 y o  female  Bay Guerrero is a 24 y o  female who is here today to discuss birth control and gardisil vaccine  Current male partner x 7 5 years with only condom use  Pregnancy is not acceptable  Desires birth control start  No health concerns  Monthly menses x 5 days with heavy flow x 2 then mod to light flow  Menses is acceptable  Passed NCLEX  The following portions of the patient's history were reviewed and updated as appropriate: allergies, current medications, past family history, past medical history, past social history, past surgical history and problem list     Review of Systems   Constitutional: Negative  Eyes: Negative for visual disturbance  Respiratory: Negative for chest tightness and shortness of breath  Cardiovascular: Negative for chest pain, palpitations and leg swelling  Gastrointestinal: Negative for abdominal pain, constipation, diarrhea, nausea and vomiting  Genitourinary: Negative for dysuria, menstrual problem, vaginal bleeding and vaginal discharge  Skin: Negative  Neurological: Negative for weakness, light-headedness and headaches  Psychiatric/Behavioral: Negative  All other systems reviewed and are negative          Objective:      /62 (BP Location: Right arm, Patient Position: Sitting)   Pulse 79   Ht 5' 2" (1 575 m)   Wt 47 7 kg (105 lb 3 2 oz)   LMP 01/30/2019   BMI 19 24 kg/m²          Physical Exam   Constitutional: She is oriented to person, place, and time  She appears well-developed and well-nourished  Neurological: She is alert and oriented to person, place, and time  Skin: Skin is warm and dry  Psychiatric: She has a normal mood and affect  Nursing note and vitals reviewed

## 2019-04-22 ENCOUNTER — CLINICAL SUPPORT (OUTPATIENT)
Dept: GYNECOLOGY | Facility: CLINIC | Age: 24
End: 2019-04-22
Payer: COMMERCIAL

## 2019-04-22 VITALS
DIASTOLIC BLOOD PRESSURE: 70 MMHG | WEIGHT: 105 LBS | SYSTOLIC BLOOD PRESSURE: 122 MMHG | HEART RATE: 79 BPM | HEIGHT: 62 IN | BODY MASS INDEX: 19.32 KG/M2

## 2019-04-22 DIAGNOSIS — Z23 NEED FOR VACCINATION: Primary | ICD-10-CM

## 2019-04-22 PROCEDURE — 90471 IMMUNIZATION ADMIN: CPT | Performed by: OBSTETRICS & GYNECOLOGY

## 2019-04-22 PROCEDURE — 90651 9VHPV VACCINE 2/3 DOSE IM: CPT | Performed by: OBSTETRICS & GYNECOLOGY

## 2019-04-22 PROCEDURE — 96372 THER/PROPH/DIAG INJ SC/IM: CPT | Performed by: OBSTETRICS & GYNECOLOGY

## 2019-06-14 ENCOUNTER — OFFICE VISIT (OUTPATIENT)
Dept: GYNECOLOGY | Facility: CLINIC | Age: 24
End: 2019-06-14
Payer: COMMERCIAL

## 2019-06-14 VITALS
DIASTOLIC BLOOD PRESSURE: 60 MMHG | HEART RATE: 70 BPM | WEIGHT: 110.4 LBS | SYSTOLIC BLOOD PRESSURE: 100 MMHG | BODY MASS INDEX: 20.19 KG/M2

## 2019-06-14 DIAGNOSIS — Q52.9: ICD-10-CM

## 2019-06-14 DIAGNOSIS — Z30.41 ENCOUNTER FOR SURVEILLANCE OF CONTRACEPTIVE PILLS: Primary | ICD-10-CM

## 2019-06-14 PROCEDURE — 99214 OFFICE O/P EST MOD 30 MIN: CPT | Performed by: NURSE PRACTITIONER

## 2019-06-14 RX ORDER — LEVONORGESTREL AND ETHINYL ESTRADIOL 0.1-0.02MG
1 KIT ORAL DAILY
Qty: 84 TABLET | Refills: 2 | Status: SHIPPED | OUTPATIENT
Start: 2019-06-14 | End: 2020-03-28 | Stop reason: SDUPTHER

## 2019-08-20 ENCOUNTER — OFFICE VISIT (OUTPATIENT)
Dept: GYNECOLOGY | Facility: CLINIC | Age: 24
End: 2019-08-20
Payer: COMMERCIAL

## 2019-08-20 DIAGNOSIS — Z23 NEED FOR HPV VACCINE: Primary | ICD-10-CM

## 2019-08-20 PROCEDURE — 90471 IMMUNIZATION ADMIN: CPT | Performed by: OBSTETRICS & GYNECOLOGY

## 2019-08-20 PROCEDURE — 90651 9VHPV VACCINE 2/3 DOSE IM: CPT | Performed by: OBSTETRICS & GYNECOLOGY

## 2019-08-22 ENCOUNTER — TRANSCRIBE ORDERS (OUTPATIENT)
Dept: RADIOLOGY | Facility: HOSPITAL | Age: 24
End: 2019-08-22

## 2019-08-22 ENCOUNTER — HOSPITAL ENCOUNTER (OUTPATIENT)
Dept: RADIOLOGY | Facility: HOSPITAL | Age: 24
Discharge: HOME | End: 2019-08-22
Payer: COMMERCIAL

## 2019-08-22 DIAGNOSIS — Q52.9: ICD-10-CM

## 2019-08-22 PROCEDURE — 76856 US EXAM PELVIC COMPLETE: CPT

## 2019-08-22 PROCEDURE — 76830 TRANSVAGINAL US NON-OB: CPT

## 2019-08-29 ENCOUNTER — HOSPITAL ENCOUNTER (EMERGENCY)
Facility: HOSPITAL | Age: 24
Discharge: HOME | End: 2019-08-29
Attending: EMERGENCY MEDICINE | Admitting: EMERGENCY MEDICINE
Payer: OTHER MISCELLANEOUS

## 2019-08-29 VITALS
HEIGHT: 62 IN | HEART RATE: 74 BPM | RESPIRATION RATE: 16 BRPM | DIASTOLIC BLOOD PRESSURE: 68 MMHG | TEMPERATURE: 97.8 F | SYSTOLIC BLOOD PRESSURE: 122 MMHG | BODY MASS INDEX: 19.49 KG/M2 | WEIGHT: 105.94 LBS | OXYGEN SATURATION: 100 %
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
## 2019-08-29 LAB
ALT SERPL W P-5'-P-CCNC: 30 U/L (ref 12–78)
HBV SURFACE AB SER-ACNC: <3.1 MIU/ML
HBV SURFACE AG SER QL: NORMAL
HCV AB SER QL: NORMAL

## 2019-08-29 PROCEDURE — 99283 EMERGENCY DEPT VISIT LOW MDM: CPT

## 2019-08-29 PROCEDURE — 86706 HEP B SURFACE ANTIBODY: CPT | Performed by: PHYSICIAN ASSISTANT

## 2019-08-29 PROCEDURE — 84460 ALANINE AMINO (ALT) (SGPT): CPT | Performed by: PHYSICIAN ASSISTANT

## 2019-08-29 PROCEDURE — 36415 COLL VENOUS BLD VENIPUNCTURE: CPT | Performed by: PHYSICIAN ASSISTANT

## 2019-08-29 PROCEDURE — 87389 HIV-1 AG W/HIV-1&-2 AB AG IA: CPT | Performed by: PHYSICIAN ASSISTANT

## 2019-08-29 PROCEDURE — 99282 EMERGENCY DEPT VISIT SF MDM: CPT | Performed by: PHYSICIAN ASSISTANT

## 2019-08-29 PROCEDURE — 87340 HEPATITIS B SURFACE AG IA: CPT | Performed by: PHYSICIAN ASSISTANT

## 2019-08-29 PROCEDURE — 86803 HEPATITIS C AB TEST: CPT | Performed by: PHYSICIAN ASSISTANT

## 2019-08-29 NOTE — ED PROVIDER NOTES
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 Taking?    BIOTIN PO Not Taking at Unknown time  Yes No   Sig: Take by mouth   Cholecalciferol (VITAMIN D3) 1000 units CAPS 8/28/2019 at Unknown time  Yes Yes   Sig: Take by mouth   Multiple Vitamin (MULTI-DAY VITAMINS) TABS 8/28/2019 at Unknown time  Yes Yes   Sig: Take by mouth   clonazePAM (KlonoPIN) 0 5 mg tablet More than a month at Unknown time  Yes No   Sig: Take 0 5 mg by mouth 2 (two) times a day   levonorgestrel-ethinyl estradiol (AVIANE,ALESSE,LESSINA) 0 1-20 MG-MCG per tablet 8/28/2019 at Unknown time  No Yes   Sig: Take 1 tablet by mouth daily      Facility-Administered Medications: None       Past Medical History:   Diagnosis Date    Anxiety     VACTERL syndrome        Past Surgical History:   Procedure Laterality Date    WISDOM TOOTH EXTRACTION         Family History   Problem Relation Age of Onset    No Known Problems Father     Hypothyroidism Mother     Bipolar disorder Mother     Hypothyroidism Maternal Grandmother     Breast cancer Paternal Grandmother     Diabetes Maternal Grandfather     Hypertension Maternal Grandfather     Stroke Maternal Grandfather     Heart attack Maternal Grandfather     Hypertension Paternal Grandfather     Coronary artery disease Paternal Grandfather     Heart failure Paternal Grandfather     No Known Problems Sister      I have reviewed and agree with the history as documented  Social History     Tobacco Use    Smoking status: Never Smoker    Smokeless tobacco: Never Used   Substance Use Topics    Alcohol use: Yes     Comment: occas   Drug use: No        Review of Systems   Constitutional: Negative for chills and fever  HENT: Negative for congestion, dental problem and sore throat  Respiratory: Negative for cough  Cardiovascular: Negative for chest pain  Gastrointestinal: Negative for abdominal pain  Musculoskeletal: Negative for back pain and neck pain  Skin: Positive for wound  Negative for rash  All other systems reviewed and are negative  Physical Exam  Physical Exam   Constitutional: She is oriented to person, place, and time  Vital signs are normal  She appears well-developed and well-nourished  HENT:   Head: Normocephalic and atraumatic  Cardiovascular: Normal rate and regular rhythm  Pulmonary/Chest: Effort normal and breath sounds normal  No respiratory distress  She has no wheezes  She has no rhonchi  She has no rales  Musculoskeletal:        Hands:  Neurological: She is alert and oriented to person, place, and time  Skin: Skin is warm and dry  Psychiatric: She has a normal mood and affect  Her behavior is normal    Nursing note and vitals reviewed        Vital Signs  ED Triage Vitals [08/29/19 1241]   Temperature Pulse Respirations Blood Pressure SpO2   97 8 °F (36 6 °C) 74 16 122/68 100 %      Temp Source Heart Rate Source Patient Position - Orthostatic VS BP Location FiO2 (%)   Oral Monitor Sitting Right arm --      Pain Score       No Pain           Vitals:    08/29/19 1241   BP: 122/68   Pulse: 74   Patient Position - Orthostatic VS: Sitting         Visual Acuity      ED Medications  Medications - No data to display    Diagnostic Studies  Results Reviewed     Procedure Component Value Units Date/Time    Hepatitis C antibody [[id number]]  (Normal) Collected:  08/29/19 1252    Lab Status:  Final result Specimen:  Blood from Arm, Left Updated:  08/29/19 1411     Hepatitis C Ab Non-reactive    Hepatitis B surface antibody [[id number]] Collected:  08/29/19 1252    Lab Status:  Final result Specimen:  Blood from Arm, Left Updated:  08/29/19 1411     Hep B S Ab <3 10 mIU/mL     Hepatitis B surface antigen [[id number]]  (Normal) Collected:  08/29/19 1252    Lab Status:  Final result Specimen:  Blood from Arm, Left Updated:  08/29/19 1411     Hepatitis B Surface Ag Non-reactive    ALT [[id number]]  (Normal) Collected:  08/29/19 1252    Lab Status:  Final result Specimen:  Blood from Arm, Left Updated:  08/29/19 1317     ALT 30 U/L     HIV 1/2 AG-AB combo [[id number]] Collected:  08/29/19 1252    Lab Status: In process Specimen:  Blood from Arm, Left Updated:  08/29/19 1300                 No orders to display              Procedures  Procedures       ED Course                               MDM  Number of Diagnoses or Management Options  Accidental needlestick injury with exposure to body fluid:   Diagnosis management comments: Differential diagnosis includes but not limited to:  Needlestick injury, puncture wound         Amount and/or Complexity of Data Reviewed  Clinical lab tests: ordered        Disposition  Final diagnoses:   Accidental needlestick injury with exposure to body fluid     Time reflects when diagnosis was documented in both MDM as applicable and the Disposition within this note     Time User Action Codes Description Comment    8/29/2019 12:56 PM Sara Plasencia Salts Add [Z77 21,  W27  3XXA] Accidental needlestick injury with exposure to body fluid       ED Disposition     ED Disposition Condition Date/Time Comment    Discharge Stable Thu Aug 29, 2019 12:56 PM Saba Ariza discharge to home/self care              Follow-up Information     Follow up With Specialties Details [place] [address] [facility] [place]    [address] [place] [address]  [phone]          Discharge Medication List as of 8/29/2019  1:08 PM      CONTINUE these medications which have NOT CHANGED    Details   BIOTIN PO Take by mouth, Historical Med      Cholecalciferol (VITAMIN D3) 1000 units CAPS Take by mouth, Historical Med      clonazePAM (KlonoPIN) 0 5 mg tablet Take 0 5 mg by mouth 2 (two) times a day, Historical Med      levonorgestrel-ethinyl estradiol (AVIANE,ALESSE,LESSINA) 0 1-20 MG-MCG per tablet Take 1 tablet by mouth daily, Starting Fri 6/14/2019, Normal      Multiple Vitamin (MULTI-DAY VITAMINS) TABS Take by mouth, Historical Med           No discharge procedures on file      ED Provider  Electronically Signed by           Larrie Curling, PA-C  08/29/19 1390

## 2019-08-30 LAB — HIV 1+2 AB+HIV1 P24 AG SERPL QL IA: NORMAL

## 2019-09-03 ENCOUNTER — TELEPHONE (OUTPATIENT)
Dept: GYNECOLOGY | Facility: CLINIC | Age: 24
End: 2019-09-03

## 2019-09-06 RX ORDER — CLONAZEPAM 0.5 MG/1
0.5 TABLET ORAL 2 TIMES DAILY PRN
Qty: 60 TABLET | Refills: 0 | Status: SHIPPED | OUTPATIENT
Start: 2019-09-06

## 2020-01-27 DIAGNOSIS — Z30.41 ENCOUNTER FOR SURVEILLANCE OF CONTRACEPTIVE PILLS: ICD-10-CM

## 2020-01-27 RX ORDER — LEVONORGESTREL AND ETHINYL ESTRADIOL 0.1-0.02MG
KIT ORAL
Qty: 84 TABLET | Refills: 0 | OUTPATIENT
Start: 2020-01-27

## 2020-03-23 ENCOUNTER — TELEPHONE (OUTPATIENT)
Dept: OTHER | Facility: OTHER | Age: 25
End: 2020-03-23

## 2020-03-23 ENCOUNTER — TELEMEDICINE (OUTPATIENT)
Dept: INTERNAL MEDICINE CLINIC | Facility: CLINIC | Age: 25
End: 2020-03-23
Payer: COMMERCIAL

## 2020-03-23 ENCOUNTER — TELEPHONE (OUTPATIENT)
Dept: INTERNAL MEDICINE CLINIC | Facility: CLINIC | Age: 25
End: 2020-03-23

## 2020-03-23 DIAGNOSIS — Z76.89 RETURN TO WORK EVALUATION: Primary | ICD-10-CM

## 2020-03-23 PROCEDURE — 99213 OFFICE O/P EST LOW 20 MIN: CPT | Performed by: INTERNAL MEDICINE

## 2020-03-23 PROCEDURE — 1036F TOBACCO NON-USER: CPT | Performed by: INTERNAL MEDICINE

## 2020-03-23 NOTE — PROGRESS NOTES
Virtual Regular Visit    Problem List Items Addressed This Visit     None      Visit Diagnoses     Return to work evaluation    -  Primary        #Return to work  -reports travel to [facility] March 6-11 however has remains in quarantine for 1 week at home then another week at home in [place]    -Denies cough/runny nose/fever/SOB  -Denies anyone in her family is sick who was quarantined with her  -clear to return to work 3/26/20    Reason for visit is work clearance    Encounter provider Dillon Grimes DO    Provider located at [address] [place] [address]  [email or web address] [address] [place] [address] [id number]  [phone]      Recent Visits  No visits were found meeting these conditions  Showing recent visits within past 7 days and meeting all other requirements     Today's Visits  Date Type Provider Dept   03/23/20 Telephone [address] Internal Med   Showing today's visits and meeting all other requirements     Future Appointments  No visits were found meeting these conditions  Showing future appointments within next 150 days and meeting all other requirements        After connecting through [employer], the patient was identified by name and date of birth  Butch Diaz was informed that this is a telemedicine visit and that the visit is being conducted through telephone which may not be secure and therefore, might not be HIPAA-compliant  My office door was closed  No one else was in the room  She acknowledged consent and understanding of privacy and security of the video platform  The patient has agreed to participate and understands they can discontinue the visit at any time  Subjective  Butch Diaz is a 21 y o  female patient reports that from March 6 through March 11th she was abroad in [facility]  She states that she did not come in contact with anyone who was sick or known to have COVID 19   She states that as soon as she went home she quarantined herself for week and then went out to [place] for another week where she continued to remain in quarantine  She denies any cough or runny nose or any fevers during this time  She states that her family was also under quarantine and none of them were sick as well  At this time we will clear her for her return to work on 03/26/2020  Past Medical History:   Diagnosis Date    Anxiety     VACTERL syndrome        Past Surgical History:   Procedure Laterality Date    WISDOM TOOTH EXTRACTION         Current Outpatient Medications   Medication Sig Dispense Refill    BIOTIN PO Take by mouth      Cholecalciferol (VITAMIN D3) 1000 units CAPS Take by mouth      clonazePAM (KlonoPIN) 0 5 mg tablet Take 0 5 mg by mouth 2 (two) times a day      clonazePAM (KlonoPIN) 0 5 mg tablet Take 1 tablet (0 5 mg total) by mouth 2 (two) times a day as needed for anxiety 60 tablet 0    levonorgestrel-ethinyl estradiol (AVIANE,ALESSE,LESSINA) 0 1-20 MG-MCG per tablet Take 1 tablet by mouth daily 84 tablet 2    Multiple Vitamin (MULTI-DAY VITAMINS) TABS Take by mouth       No current facility-administered medications for this visit  Allergies   Allergen Reactions    Doxycycline Rash       Review of Systems  Review of Systems - General ROS: negative  ENT ROS: negative  Respiratory ROS: no cough, shortness of breath, or wheezing  Cardiovascular ROS: negative  Gastrointestinal ROS: no abdominal pain, change in bowel habits, or black or bloody stools  Genito-Urinary ROS: no dysuria, trouble voiding, or hematuria  Musculoskeletal ROS: negative  Neurological ROS: negative      I spent 15 minutes with the patient during this visit

## 2020-03-23 NOTE — TELEPHONE ENCOUNTER
PT STATED THAT SHE WAS IN [place] FROM 3/6-3/11 AND NOW NEEDS A LETTER FROM YOU SAYING THAT SHE'S HEALTHY (HAS NO COVID-19 SYMPTOMS) AND CAN WORK    I TOLD PT THAT YOU'LL MORE THAN LIKELY  NEED TO DO A PHONE CALL APPT FOR THAT BUT WANTED TO CHECK WITH YOU   PLEASE ADVISE

## 2020-03-23 NOTE — LETTER
March 23, 2020     Patient: Sierra Everett   YOB: 1995   Date of Visit: 3/23/2020       To Whom it May Concern:    Silvestre Lozada is under my professional care  She was evaluated by telephone encounter  She was abroad however will complete the 2 week quarantine by 3/25/20  She and her family remains asymptomatic  If she continues to remain asymptomatic, she may return to work 3/26/20  If you have any questions or concerns, please don't hesitate to call           Sincerely,          Raj Padilla DO        CC: No Recipients

## 2020-03-28 DIAGNOSIS — Z30.41 ENCOUNTER FOR SURVEILLANCE OF CONTRACEPTIVE PILLS: ICD-10-CM

## 2020-03-31 RX ORDER — LEVONORGESTREL AND ETHINYL ESTRADIOL 0.1-0.02MG
1 KIT ORAL DAILY
Qty: 84 TABLET | Refills: 0 | Status: SHIPPED | OUTPATIENT
Start: 2020-03-31 | End: 2020-04-03 | Stop reason: SDUPTHER

## 2020-03-31 NOTE — TELEPHONE ENCOUNTER
MAYO for patient to contact the office about her refill request  Patient will need a virtual visit for a refill per Luly

## 2020-04-03 ENCOUNTER — TELEMEDICINE (OUTPATIENT)
Dept: GYNECOLOGY | Facility: CLINIC | Age: 25
End: 2020-04-03
Payer: COMMERCIAL

## 2020-04-03 VITALS — HEIGHT: 62 IN | BODY MASS INDEX: 19.32 KG/M2 | WEIGHT: 105 LBS

## 2020-04-03 DIAGNOSIS — Z30.41 ENCOUNTER FOR SURVEILLANCE OF CONTRACEPTIVE PILLS: ICD-10-CM

## 2020-04-03 PROCEDURE — 99213 OFFICE O/P EST LOW 20 MIN: CPT | Performed by: NURSE PRACTITIONER

## 2020-04-03 PROCEDURE — 3008F BODY MASS INDEX DOCD: CPT | Performed by: NURSE PRACTITIONER

## 2020-04-03 RX ORDER — LEVONORGESTREL AND ETHINYL ESTRADIOL 0.1-0.02MG
1 KIT ORAL DAILY
Qty: 84 TABLET | Refills: 0 | Status: SHIPPED | OUTPATIENT
Start: 2020-04-03 | End: 2020-06-27 | Stop reason: SDUPTHER

## 2020-06-27 DIAGNOSIS — Z30.41 ENCOUNTER FOR SURVEILLANCE OF CONTRACEPTIVE PILLS: ICD-10-CM

## 2020-06-29 RX ORDER — LEVONORGESTREL AND ETHINYL ESTRADIOL 0.1-0.02MG
1 KIT ORAL DAILY
Qty: 84 TABLET | Refills: 0 | Status: SHIPPED | OUTPATIENT
Start: 2020-06-29

## 2021-08-24 ENCOUNTER — TELEPHONE (OUTPATIENT)
Dept: INTERNAL MEDICINE CLINIC | Facility: CLINIC | Age: 26
End: 2021-08-24